# Patient Record
Sex: FEMALE | Race: WHITE | NOT HISPANIC OR LATINO | Employment: OTHER | URBAN - METROPOLITAN AREA
[De-identification: names, ages, dates, MRNs, and addresses within clinical notes are randomized per-mention and may not be internally consistent; named-entity substitution may affect disease eponyms.]

---

## 2017-05-15 ENCOUNTER — HOSPITAL ENCOUNTER (OUTPATIENT)
Dept: BONE DENSITY | Facility: CLINIC | Age: 67
Discharge: HOME | End: 2017-05-15
Attending: OBSTETRICS & GYNECOLOGY | Admitting: OBSTETRICS & GYNECOLOGY
Payer: MEDICARE

## 2017-05-15 DIAGNOSIS — Z78.0 POST-MENOPAUSE: ICD-10-CM

## 2017-05-15 PROCEDURE — 77080 DXA BONE DENSITY AXIAL: CPT

## 2017-12-05 ENCOUNTER — HOSPITAL ENCOUNTER (OUTPATIENT)
Dept: MAMMOGRAPHY | Facility: CLINIC | Age: 67
Discharge: HOME | End: 2017-12-05
Attending: OBSTETRICS & GYNECOLOGY | Admitting: OBSTETRICS & GYNECOLOGY
Payer: MEDICARE

## 2017-12-05 DIAGNOSIS — Z12.31 VISIT FOR SCREENING MAMMOGRAM: ICD-10-CM

## 2017-12-05 PROCEDURE — G0202 SCR MAMMO BI INCL CAD: HCPCS

## 2017-12-05 PROCEDURE — 77063 BREAST TOMOSYNTHESIS BI: CPT

## 2018-01-02 ENCOUNTER — THERAPY VISIT (OUTPATIENT)
Dept: OCCUPATIONAL THERAPY | Facility: CLINIC | Age: 68
End: 2018-01-02
Payer: MEDICARE

## 2018-01-02 DIAGNOSIS — Z47.89 AFTERCARE FOLLOWING SURGERY OF THE MUSCULOSKELETAL SYSTEM: ICD-10-CM

## 2018-01-02 DIAGNOSIS — M25.532 LEFT WRIST PAIN: ICD-10-CM

## 2018-01-02 DIAGNOSIS — M79.645 THUMB PAIN, LEFT: Primary | ICD-10-CM

## 2018-01-02 PROCEDURE — 97110 THERAPEUTIC EXERCISES: CPT | Mod: GO | Performed by: OCCUPATIONAL THERAPIST

## 2018-01-02 PROCEDURE — 97140 MANUAL THERAPY 1/> REGIONS: CPT | Mod: GO | Performed by: OCCUPATIONAL THERAPIST

## 2018-01-02 PROCEDURE — G8984 CARRY CURRENT STATUS: HCPCS | Mod: GO | Performed by: OCCUPATIONAL THERAPIST

## 2018-01-02 PROCEDURE — G8985 CARRY GOAL STATUS: HCPCS | Mod: GO | Performed by: OCCUPATIONAL THERAPIST

## 2018-01-02 PROCEDURE — 97165 OT EVAL LOW COMPLEX 30 MIN: CPT | Mod: GO | Performed by: OCCUPATIONAL THERAPIST

## 2018-01-02 NOTE — PROGRESS NOTES
Hand Therapy Initial Evaluation  Current Date:  1/2/2018    Referring MD: Dr. Montana Summers  Return to MD: Follow up in 5 weeks over phone (Surgeon is in [place])    Procedure:  L Thumb CMC Arthroplasty  Procedure 2: L CTR  DOS: 11/17/2017  Post:  6w 4d    Subjective:  Alem Jack is a 67 year old left hand dominant female.    Patient reports symptoms of pain, stiffness/loss of motion and weakness/loss of strength of the left wrist and thumb which occurred due to above procedures. Since onset symptoms are Gradually getting better.  Special tests:  x-ray.  Previous treatment: Above procedures, OTC wrist cock up splints.    General health as reported by patient is good.  Pertinent medical history includes:cancer, overweight, high blood pressure  Medical allergies:none.  Surgical history: orthopedic: R CMC Arthroplasty, L CMC Arthroplasty, L CTR.  Medication history: sleep, high blood pressure.    Occupational Profile Information:  Current occupation is Retired Teacher, occasionally subbing (music, all ages)  Currently not working due to present treatment problem - hoping to start subbing again in next month or so  Job Tasks: other instrument playing, minimal writing  Prior functional level:  no limitations  Barriers include:none  Mobility: No difficulty  Transportation: drives  Leisure activities/hobbies: Playing piano, french horn, reading  Other: Lives alone, has son and daughter in law close to help as needed    Upper Extremity Functional Index Score:  SCORE:   Column Totals: /80: 73   (A lower score indicates greater disability.)    Objective:    Pain Report:  VAS(0-10) 1/2/18   At Rest: 0/10   With Use: 1-2/10   At Worst 3-4/10   Location:  L CMCJ, incisional pain (CMC and CT)  Description:  Tight/stiff, sensitivity around scars, occasionally aching, throbbing  Frequency:  intermittent   Pain is worse:  At night  Pain is exacerbated by:  Gripping, pinching, opening jars, writing  Pain is relieved by:  Rest,  stopping the aggravating activity, splint  Progression since onset:  improving    ROM: Thumb AROM (PROM)  1/2/18 Date: 1/2/18   Right Side: Left   +/31 MCP  +/40   +/60 IP +/46   30 RABD 35   45 PABD 45     ROM: Wrist AROM (PROM)  1/2/18 Date: 1/2/18   Right Side: Left   66 Extension 52   70 Flexion 47   20 RD 15   35 UD 35   76 Supination 76+   80 Pronation 80      and Pinch Strength (pounds)  1/2 Date: 1/2   Right    Side  Left   52        # 1                # 2                # 3         Average 27 to pain   10  3 Point  # 1               # 2               # 3        Average 5   11  Lateral  # 1                # 2                # 3         Average 8     Edema:  []        None  [x]         Mild   []        Moderate  []         Severe     []         of affected part    Scar/Wound:    All scars feel tight and burning  Carpal Tunnel: Tender to palpation, moderately adhered  CMC: Non tender to palpation, mobile, very mildly adhered  FCR [place]/volar FA: Non tender to palpation, mobile, very mild adhesion    Sensation: [x]         WNL throughout all nerve distributions; per patient report    []         Decreased  Median  Ulnar  Radial  nerve distribution   Comment: Resolved since surgery    Palpation:  Date 1/2/2018   Side Left   CMCJ neg   Adductor neg   Thenars neg   hypothenars neg         Appearance  Date 1/2/18   Hyperextension of MPJ with pinch R: +  L: very mild   Swollen over CMCJ R: -  L: mild                     Assessment:  Patient presents with symptoms consistent with diagnosis as listed above with surgical  intervention.     Patient's limitations or Problem List includes:  Pain, Decreased ROM/motion, Weakness, Adherent scarring, Decreased stability, Decreased , Decreased pinch and Tightness in musculature of the left wrist and thumb which interferes with the patient's ability to perform Self Care Tasks (dressing, eating, bathing, hygiene/toileting), Work Tasks, Recreational Activities and  Household Chores as compared to previous level of function.    Rehab Potential:  Good - Return to full activity, some limitations    Patient will benefit from skilled Occupational Therapy to increase ROM,  strength, pinch strength and stability of thumb and decrease pain and adherence of scarring to return to previous activity level and resume normal daily tasks and to reach their rehab potential.    Barriers to Learning:  No barrier    Communication Issues:  Patient appears to be able to clearly communicate and understand verbal and written communication and follow directions correctly.    Chart Review: Detailed history review with patient    Identified Performance Deficits: dressing, hygiene and grooming, home establishment and management, meal preparation and cleanup, work and leisure activities    Assessment of Occupational Performance:  5 or more Performance Deficits    Clinical Decision Making (Complexity): Low complexity    Treatment Explanation:  The following has been discussed with the patient:  RX ordered/plan of care  Anticipated outcomes  Possible risks and side effects    Plan:  Frequency:  1 X week, once daily  Duration:  for 6 weeks    Treatment Plan:  Modalities:  US and Laser Light  Therapeutic Exercise:  AROM, AAROM, PROM and Stabilization  Manual Techniques:  Scar mobilization and Myofascial release  Self Care:  Self Care Tasks    Home Program:  FABTSS - from post surgical appointment, protection as needed per MD  AROM of thumb: opposition, flexion/extension, circumduction, EPB AROM  AROM of wrist: extension/flexion, circumduction  Scar mobs 2 x daily    Next visit:   US vs Laser to scars  Scar mobs  MFR - thenars, adductor  ROM  Progress to gentle thumb stability/strengthening    Discharge Plan:  Achieve all LTG.  Independent in home treatment program.  Reach maximal therapeutic benefit.    Please see daily flow sheet for treatment and 1:1 time provided today.

## 2018-01-02 NOTE — LETTER
DEPARTMENT OF HEALTH AND HUMAN SERVICES  [facility] [facility]    PLAN/UPDATED PLAN OF PROGRESS FOR OUTPATIENT REHABILITATION  PATIENTS NAME:  Alem Jack   : 1950  PROVIDER NUMBER:  [id number]  [id number]  [facility]  MEDICAL RECORD NUMBER: [id number]   START OF CARE DATE:    SOC Date: 18   TYPE:  OT  PRIMARY/TREATMENT DIAGNOSIS: (Pertinent Medical Diagnosis)   Thumb pain, left  Left wrist pain  Aftercare following surgery of the musculoskeletal system  VISITS FROM START OF CARE:  1 Rxs Used: 1   Hand Therapy Initial Evaluation  Current Date:  2018  Referring MD: Dr. Montana Summers  Return to MD: Follow up in 5 weeks over phone (Surgeon is in [place])  Procedure:  L Thumb CMC Arthroplasty  Procedure 2: L CTR  DOS: 2017  Post:  6w 4d  Subjective:  Alem Jack is a 67 year old left hand dominant female.  Patient reports symptoms of pain, stiffness/loss of motion and weakness/loss of strength of the left wrist and thumb which occurred due to above procedures. Since onset symptoms are Gradually getting better.  Special tests:  x-ray.  Previous treatment: Above procedures, OTC wrist cock up splints.    General health as reported by patient is good.  Pertinent medical history includes:cancer, overweight, high blood pressure  Medical allergies:none.  Surgical history: orthopedic: R CMC Arthroplasty, L CMC Arthroplasty, L CTR.  Medication history: sleep, high blood pressure.  Occupational Profile Information:  Current occupation is Retired Teacher, occasionally subbing (music, all ages)  Currently not working due to present treatment problem - hoping to start subbing again in next month or so  Job Tasks: other instrument playing, minimal writing  Prior functional level:  no limitations  Barriers include:none  Mobility: No difficulty  Transportation: drives  Leisure activities/hobbies: Playing piano, Macanese horn, reading  Other: Lives alone, has  son and daughter in law close to help as needed  Upper Extremity Functional Index Score:  SCORE:   Column Totals: /80: 73   (A lower score indicates greater disability.)  Objective:  Pain Report:  VAS(0-10) 18   At Rest: 0/10   With Use: 1-2/10   At Worst 3-4/10   Location:  L CMCJ, incisional pain (CMC and CT)  Description:  Tight/stiff, sensitivity around scars, occasionally aching, throbbing  Frequency:  intermittent   Pain is worse:  At night  Pain is exacerbated by:  Gripping, pinching, opening jars, writing  Pain is relieved by:  Rest, stopping the aggravating activity, splint  Progression since onset:  improving  ROM: Thumb AROM (PROM)  18 Date: 18   Right Side: Left   +/31 MCP  +/40   +/60 IP +/46   30 RABD 35   45 PABD 45   ROM: Wrist AROM (PROM)  18 Date: 18   Right Side: Left   66 Extension 52   70 Flexion 47   20 RD 15   35 UD 35   76 Supination 76+   80 Pronation 80    and Pinch Strength (pounds)   Date:    Right    Side  Left   52        # 1                # 2                # 3         Average 27 to pain   10  3 Point  # 1               # 2               # 3        Average 5   11  Lateral  # 1                # 2                # 3         Average 8   Edema:  []        None  [x]         Mild   []        Moderate  []         Severe     []         of affected part  Scar/Wound:    All scars feel tight and burning  Carpal Tunnel: Tender to palpation, moderately adhered  CMC: Non tender to palpation, mobile, very mildly adhered  FCR [place]/volar FA: Non tender to palpation, mobile, very mild adhesion  Sensation: [x]         WNL throughout all nerve distributions; per patient report    []         Decreased  Median  Ulnar  Radial  nerve distribution   PATIENTS NAME:  Alem Jack   : 1950    Comment: Resolved since surgery  Palpation:  Date 2018   Side Left   CMCJ neg   Adductor neg   Thenars neg   hypothenars neg       Appearance  Date 18    Hyperextension of MPJ with pinch R: +  L: very mild   Swollen over CMCJ R: -  L: mild                   Assessment:  Patient presents with symptoms consistent with diagnosis as listed above with surgical  intervention.   Patient's limitations or Problem List includes:  Pain, Decreased ROM/motion, Weakness, Adherent scarring, Decreased stability, Decreased , Decreased pinch and Tightness in musculature of the left wrist and thumb which interferes with the patient's ability to perform Self Care Tasks (dressing, eating, bathing, hygiene/toileting), Work Tasks, Recreational Activities and Household Chores as compared to previous level of function.  Rehab Potential:  Good - Return to full activity, some limitations  Patient will benefit from skilled Occupational Therapy to increase ROM,  strength, pinch strength and stability of thumb and decrease pain and adherence of scarring to return to previous activity level and resume normal daily tasks and to reach their rehab potential.  Barriers to Learning:  No barrier  Communication Issues:  Patient appears to be able to clearly communicate and understand verbal and written communication and follow directions correctly.  Chart Review: Detailed history review with patient  Identified Performance Deficits: dressing, hygiene and grooming, home establishment and management, meal preparation and cleanup, work and leisure activities    Assessment of Occupational Performance:  5 or more Performance Deficits  Clinical Decision Making (Complexity): Low complexity  Treatment Explanation:  The following has been discussed with the patient:  RX ordered/plan of care  Anticipated outcomes  Possible risks and side effects  Plan:  Frequency:  1 X week, once daily  Duration:  for 6 weeks  Treatment Plan:  Modalities:  US and Laser Light  Therapeutic Exercise:  AROM, AAROM, PROM and Stabilization  Manual Techniques:  Scar mobilization and Myofascial release  PATIENTS NAME:  Guero  "Alem   : 1950    Self Care:  Self Care Tasks  Home Program:  FABTSS - from post surgical appointment, protection as needed per MD  AROM of thumb: opposition, flexion/extension, circumduction, EPB AROM  AROM of wrist: extension/flexion, circumduction  Scar mobs 2 x daily  Next visit:   US vs Laser to scars  Scar mobs  MFR - thenars, adductor  ROM  Progress to gentle thumb stability/strengthening  Discharge Plan:  Achieve all LTG.  Independent in home treatment program.  Reach maximal therapeutic benefit.    Caregiver Signature/Credentials ______________________________ Date ________       Treating Provider: Ashley Ambriz OTR/L CHT    I have reviewed and certified the need for these services and plan of treatment while under my care.        PHYSICIAN'S SIGNATURE:   _____________________________________  Date___________     Montana Summers MD     Certification period: Beginning of Cert date period: 18 End of Cert period date: 18     Functional Level Progress Report: Please see attached \"Goal Flow sheet for Functional level.\"    ___X_____ Continue Services or       ________ DC Services                Service dates: SOC Date: 18  to present                                                                     "

## 2018-01-08 ENCOUNTER — THERAPY VISIT (OUTPATIENT)
Dept: OCCUPATIONAL THERAPY | Facility: CLINIC | Age: 68
End: 2018-01-08
Payer: MEDICARE

## 2018-01-08 DIAGNOSIS — M79.645 THUMB PAIN, LEFT: ICD-10-CM

## 2018-01-08 DIAGNOSIS — Z47.89 AFTERCARE FOLLOWING SURGERY OF THE MUSCULOSKELETAL SYSTEM: ICD-10-CM

## 2018-01-08 DIAGNOSIS — M25.532 LEFT WRIST PAIN: ICD-10-CM

## 2018-01-08 PROCEDURE — 97140 MANUAL THERAPY 1/> REGIONS: CPT | Mod: GO | Performed by: OCCUPATIONAL THERAPIST

## 2018-01-08 PROCEDURE — 97110 THERAPEUTIC EXERCISES: CPT | Mod: GO | Performed by: OCCUPATIONAL THERAPIST

## 2018-01-08 NOTE — PROGRESS NOTES
SOAP note objective information for 1/8/2018.  ROM: Thumb AROM (PROM)  1/2/18 Date: 1/2/18 1/8/18   Right Side: Left    +/31 MCP  +/40 +/39   +/60 IP +/46 0/28   30 RABD 35 50   45 PABD 45 54     ROM: Wrist AROM (PROM)  1/2/18 Date: 1/2/18 1/8/18   Right Side: Left    66 Extension 52 60   70 Flexion 47 48   20 RD 15 17   35 UD 35 38   76 Supination 76+ 74   80 Pronation 80 81   Please refer to the daily flowsheet for treatment today, total treatment time and time spent performing 1:1 timed codes.       Home Program:  FABTSS - from post surgical appointment, protection as needed per MD  AROM of thumb: opposition, flexion/extension, circumduction, EPB AROM  AROM of wrist: extension/flexion, circumduction  Scar mobs 2 x daily    Next visit:   US vs Laser to scars  Scar mobs  MFR - thenars, adductor  ROM  Progress to gentle thumb stability/strengthening

## 2018-01-15 ENCOUNTER — THERAPY VISIT (OUTPATIENT)
Dept: OCCUPATIONAL THERAPY | Facility: CLINIC | Age: 68
End: 2018-01-15
Payer: MEDICARE

## 2018-01-15 DIAGNOSIS — Z47.89 AFTERCARE FOLLOWING SURGERY OF THE MUSCULOSKELETAL SYSTEM: ICD-10-CM

## 2018-01-15 DIAGNOSIS — M25.532 LEFT WRIST PAIN: ICD-10-CM

## 2018-01-15 DIAGNOSIS — M79.645 THUMB PAIN, LEFT: ICD-10-CM

## 2018-01-15 PROCEDURE — 97140 MANUAL THERAPY 1/> REGIONS: CPT | Mod: GO | Performed by: OCCUPATIONAL THERAPIST

## 2018-01-15 PROCEDURE — 97110 THERAPEUTIC EXERCISES: CPT | Mod: GO | Performed by: OCCUPATIONAL THERAPIST

## 2018-01-15 NOTE — PROGRESS NOTES
SOAP note objective information for 1/15/2018.  ROM: Thumb AROM (PROM)  1/2/18 Date: 1/2/18 1/8/18 1/15/18   Right Side: Left     +/31 MCP  +/40 +/39 +/46   +/60 IP +/46 0/28 0/32   30 RABD 35 50 53   45 PABD 45 54 57     ROM: Wrist AROM (PROM)  1/2/18 Date: 1/2/18 1/8/18 1/15/18   Right Side: Left     66 Extension 52 60 67   70 Flexion 47 48 58   20 RD 15 17 17   35 UD 35 38 45   76 Supination 76+ 74 76   80 Pronation 80 81 81   Please refer to the daily flowsheet for treatment today, total treatment time and time spent performing 1:1 timed codes.       Home Program:  FABTSS - from post surgical appointment, protection as needed per MD  AROM of thumb: opposition, flexion/extension, circumduction, EPB AROM  AROM of wrist: extension/flexion, circumduction  Scar mobs 2 x daily  Strengthening   Wrist isotonics    and pinch strengthening    Next visit:   US vs Laser to scars  Scar mobs  MFR - thenars, adductor  ROM  Progress to gentle thumb stability/strengthening

## 2018-01-24 ENCOUNTER — THERAPY VISIT (OUTPATIENT)
Dept: OCCUPATIONAL THERAPY | Facility: CLINIC | Age: 68
End: 2018-01-24
Payer: MEDICARE

## 2018-01-24 DIAGNOSIS — M79.645 THUMB PAIN, LEFT: ICD-10-CM

## 2018-01-24 DIAGNOSIS — M25.532 LEFT WRIST PAIN: ICD-10-CM

## 2018-01-24 DIAGNOSIS — Z47.89 AFTERCARE FOLLOWING SURGERY OF THE MUSCULOSKELETAL SYSTEM: ICD-10-CM

## 2018-01-24 PROCEDURE — 97110 THERAPEUTIC EXERCISES: CPT | Mod: GO | Performed by: OCCUPATIONAL THERAPIST

## 2018-01-24 PROCEDURE — 97140 MANUAL THERAPY 1/> REGIONS: CPT | Mod: GO | Performed by: OCCUPATIONAL THERAPIST

## 2018-01-24 NOTE — PROGRESS NOTES
SOAP note objective information for 1/24/2018.  ROM: Thumb AROM (PROM)  1/2/18 Date: 1/2/18 1/8/18 1/15/18 1/24/18   Right Side: Left      +/31 MCP  +/40 +/39 +/46 +/47   +/60 IP +/46 0/28 0/32 0/30   30 RABD 35 50 53 55   45 PABD 45 54 57 48     ROM: Wrist AROM (PROM)  1/2/18 Date: 1/2/18 1/8/18 1/15/18 1/24/18   Right Side: Left      66 Extension 52 60 67 62+   70 Flexion 47 48 58 47   20 RD 15 17 17 16   35 UD 35 38 45 37   76 Supination 76+ 74 76 76   80 Pronation 80 81 81 86   Please refer to the daily flowsheet for treatment today, total treatment time and time spent performing 1:1 timed codes.       Home Program:  FABTSS - weaning  AROM of thumb: all planes  AROM of wrist: all planes  Scar mobs 2 x daily  Strengthening   Wrist isotonics    and pinch strengthening-gentle    Next visit:   heat  Scar mobs  MFR - thenars, adductor  ROM  gentle thumb stability/strengthening

## 2018-01-31 ENCOUNTER — THERAPY VISIT (OUTPATIENT)
Dept: OCCUPATIONAL THERAPY | Facility: CLINIC | Age: 68
End: 2018-01-31
Payer: MEDICARE

## 2018-01-31 DIAGNOSIS — M25.532 LEFT WRIST PAIN: ICD-10-CM

## 2018-01-31 DIAGNOSIS — M79.645 THUMB PAIN, LEFT: ICD-10-CM

## 2018-01-31 DIAGNOSIS — Z47.89 AFTERCARE FOLLOWING SURGERY OF THE MUSCULOSKELETAL SYSTEM: ICD-10-CM

## 2018-01-31 PROCEDURE — G8984 CARRY CURRENT STATUS: HCPCS | Mod: GO | Performed by: OCCUPATIONAL THERAPIST

## 2018-01-31 PROCEDURE — 97110 THERAPEUTIC EXERCISES: CPT | Mod: GO | Performed by: OCCUPATIONAL THERAPIST

## 2018-01-31 PROCEDURE — G8985 CARRY GOAL STATUS: HCPCS | Mod: GO | Performed by: OCCUPATIONAL THERAPIST

## 2018-01-31 PROCEDURE — 97140 MANUAL THERAPY 1/> REGIONS: CPT | Mod: GO | Performed by: OCCUPATIONAL THERAPIST

## 2018-01-31 NOTE — PROGRESS NOTES
"Hand Therapy Progress Note    Current Date:  1/31/2018    Referring MD: Dr. Montana Summers  Return to MD: Follow up in 5 weeks over phone (Surgeon is in [place])    Procedure:  L Thumb CMC Arthroplasty  Procedure 2: L CTR  DOS: 11/17/2017  Post:  9w 5d    Reporting period is 1/2/18 to 1/31/2018    Subjective:   Subjective changes noted by patient:  \"It is sore sometimes.  It throbs sometimes.\"  Functional changes noted by patient:  Improvement in Self Care Tasks (dressing, eating, bathing, hygiene/toileting), Work Tasks, Sleep Patterns, Recreational Activities, Household Chores and Driving   Patient has noted adverse reaction to:  None    Functional Outcome Measure:  Upper Extremity Functional Index Score:  SCORE:   Column Totals: /80: 65   (A lower score indicates greater disability.)    Objective:  Pain Report:  VAS(0-10) 1/2/18 1/31/18   At Rest: 0/10 0/10   With Use: 1-2/10    At Worst 3-4/10 5/10   Location:  L CMCJ, index finger MCP joint  Description:  sharp  Frequency:  intermittent   Pain is worse:  At night  Pain is exacerbated by:  Gripping, pinching, opening jars, writing  Pain is relieved by:  Rest, stopping the aggravating activity, splint  Progression since onset:  improving    ROM: Thumb AROM (PROM)  1/2/18 Date: 1/2/18 1/31/18   Right Side: Left    +/31 MCP  +/40 +/41   +/60 IP +/46 +/36   30 RABD 35 60   45 PABD 45 51     ROM: Wrist AROM (PROM)  1/2/18 Date: 1/2/18 1/31/18   Right Side: Left    66 Extension 52 56   70 Flexion 47 44   20 RD 15 16   35 UD 35 45   76 Supination 76+ 77   80 Pronation 80 86      and Pinch Strength (pounds)  1/2 Date: 1/2 1/31/18   Right    Side  Left    52        # 1                # 2                # 3         Average 27 to pain 29  33  44  35   10  3 Point  # 1               # 2               # 3        Average 5 7  8  7  7   11  Lateral  # 1                # 2                # 3         Average 8 8  9  10  9     Edema:  []        None  [x]         Mild   []        " Moderate  []         Severe     []         of affected part    Scar/Wound:    All scars feel tight and burning  Carpal Tunnel: Tender to palpation, moderately adhered  CMC: Non tender to palpation, mobile, very mildly adhered  FCR [place]/volar FA: Non tender to palpation, mobile, very mild adhesion    Sensation: [x]         WNL throughout all nerve distributions; per patient report    []         Decreased  Median  Ulnar  Radial  nerve distribution   Comment: Resolved since surgery    Palpation:  Date 1/2/2018   Side Left   CMCJ neg   Adductor neg   Thenars neg   hypothenars neg         Appearance  Date 1/2/18   Hyperextension of MPJ with pinch R: +  L: very mild   Swollen over CMCJ R: -  L: mild                     Please refer to the daily flowsheet for treatment provided today.     Assessment:  Response to therapy has been improvement to:  ROM of Wrist:  All Planes  Thumb:  All Planes  Strength:   and pinch  Pain:  frequency is less, intensity of pain is decreased, duration of pain is decreased and less tender over affected area    Overall Assessment:  Patient's symptoms are resolving.  Patient is progressing well and is ready to decrease frequency of treatment in the clinic.  STG/LTG:  STGoals have been reviewed and progress or achievement has occurred;  see goal sheet for details and updates.    Plan:  Frequency/Duration:  Recommend continuing to see patient  2 X a month, once daily  for 2 months  Appropriateness of Rx I have re-evaluated this patient and find that the nature, scope, duration and intensity of the therapy is appropriate for the medical condition of the patient.  Recommendations for Continued Therapy  Additions to Treatment Plan -  Therapeutic Exercise:  Hand strengthening    Home Program:  FABTSS - weaning  AROM of thumb: all planes  AROM of wrist: all planes  Scar mobs 2 x daily  Strengthening   Wrist isotonics    and pinch strengthening-gentle    Next visit:   heat  Scar mobs  MFR -  thenars, adductor  ROM  gentle thumb stability/strengthening

## 2018-02-14 ENCOUNTER — THERAPY VISIT (OUTPATIENT)
Dept: OCCUPATIONAL THERAPY | Facility: CLINIC | Age: 68
End: 2018-02-14
Payer: MEDICARE

## 2018-02-14 DIAGNOSIS — Z47.89 AFTERCARE FOLLOWING SURGERY OF THE MUSCULOSKELETAL SYSTEM: ICD-10-CM

## 2018-02-14 DIAGNOSIS — M79.645 THUMB PAIN, LEFT: ICD-10-CM

## 2018-02-14 DIAGNOSIS — M25.532 LEFT WRIST PAIN: ICD-10-CM

## 2018-02-14 PROCEDURE — 97110 THERAPEUTIC EXERCISES: CPT | Mod: GO | Performed by: OCCUPATIONAL THERAPIST

## 2018-02-14 PROCEDURE — 97140 MANUAL THERAPY 1/> REGIONS: CPT | Mod: GO | Performed by: OCCUPATIONAL THERAPIST

## 2018-02-14 NOTE — PROGRESS NOTES
SOAP note objective information for 2/14/2018.  ROM: Thumb AROM (PROM)  1/2/18 Date: 1/2/18 1/31/18 2/14/18   Right Side: Left     +/31 MCP  +/40 +/41 +/43   +/60 IP +/46 +/36 +/44   30 RABD 35 60 60   45 PABD 45 51 54     ROM: Wrist AROM (PROM)  1/2/18 Date: 1/2/18 1/31/18 2/14/18   Right Side: Left     66 Extension 52 56 70   70 Flexion 47 44 50   20 RD 15 16 17   35 UD 35 45 45   76 Supination 76+ 77 77   80 Pronation 80 86      Please refer to the daily flowsheet for treatment today, total treatment time and time spent performing 1:1 timed codes.       Home Program:  FABTSS - weaning  AROM of thumb: all planes  AROM of wrist: all planes  Scar mobs 2 x daily  Strengthening   Wrist isotonics    and pinch strengthening-gentle    Next visit:   heat  Scar mobs  MFR - thenars, adductor  ROM  gentle thumb stability/strengthening

## 2018-03-12 ENCOUNTER — THERAPY VISIT (OUTPATIENT)
Dept: OCCUPATIONAL THERAPY | Facility: CLINIC | Age: 68
End: 2018-03-12
Payer: MEDICARE

## 2018-03-12 DIAGNOSIS — M79.645 THUMB PAIN, LEFT: ICD-10-CM

## 2018-03-12 DIAGNOSIS — Z47.89 AFTERCARE FOLLOWING SURGERY OF THE MUSCULOSKELETAL SYSTEM: ICD-10-CM

## 2018-03-12 DIAGNOSIS — M25.532 LEFT WRIST PAIN: ICD-10-CM

## 2018-03-12 PROCEDURE — 97110 THERAPEUTIC EXERCISES: CPT | Mod: GO | Performed by: OCCUPATIONAL THERAPIST

## 2018-03-12 PROCEDURE — G8986 CARRY D/C STATUS: HCPCS | Mod: GO | Performed by: OCCUPATIONAL THERAPIST

## 2018-03-12 PROCEDURE — 97140 MANUAL THERAPY 1/> REGIONS: CPT | Mod: GO | Performed by: OCCUPATIONAL THERAPIST

## 2018-03-12 PROCEDURE — G8985 CARRY GOAL STATUS: HCPCS | Mod: GO | Performed by: OCCUPATIONAL THERAPIST

## 2018-03-12 NOTE — PROGRESS NOTES
"Hand Therapy Discharge Note    Current Date:  3/12/2018    Referring MD: Dr. Montana Summers  Return to MD: Follow up in 5 weeks over phone (Surgeon is in [place])    Procedure:  L Thumb CMC Arthroplasty  Procedure 2: L CTR  DOS: 11/17/2017  Post:  > 4 months    Reporting period is 1/31/18 to 3/12/2018    Subjective:   Subjective changes noted by patient:  \"There are times when I get these \"phantom pains\" in my thumb, but overall, it is feeling better.\"  Functional changes noted by patient:  Improvement in Self Care Tasks (dressing, eating, bathing, hygiene/toileting), Work Tasks, Sleep Patterns, Recreational Activities, Household Chores and Driving   Patient has noted adverse reaction to:  None    Functional Outcome Measure:  Upper Extremity Functional Index Score:  SCORE:   Column Totals: /80: 73   (A lower score indicates greater disability.)    Objective:  Pain Report:  VAS(0-10) 1/2/18 1/31/18 3/12/18   At Rest: 0/10 0/10 0/10   With Use: 1-2/10     At Worst 3-4/10 5/10 8/10   Location:  L thumb CMCJ  Description:  sharp  Frequency:  intermittent   Pain is worse:  At night  Pain is exacerbated by:  Gripping, pinching, opening jars, writing  Pain is relieved by:  Rest, stopping the aggravating activity, splint  Progression since onset:  improving    ROM: Thumb AROM (PROM)  1/2/18 Date: 1/2/18 1/31/18 3/12/18   Right Side: Left     +/31 MCP  +/40 +/41 +/44   +/60 IP +/46 +/36 +/54   30 RABD 35 60 60   45 PABD 45 51 56     ROM: Wrist AROM (PROM)  1/2/18 Date: 1/2/18 1/31/18 3/12/18   Right Side: Left     66 Extension 52 56 64   70 Flexion 47 44 50   20 RD 15 16 16   35 UD 35 45 45   76 Supination 76+ 77 80   80 Pronation 80 86 87      and Pinch Strength (pounds)  1/2 Date: 1/2 1/31/18 3/12/18   Right    Side  Left     52        # 1                # 2                # 3         Average 27 to pain 29  33  44  35 35  47  47  43   10  3 Point  # 1               # 2               # 3        Average 5 7  8  7  7 " 7  8  8  8   11  Lateral  # 1                # 2                # 3         Average 8 8  9  10  9 11  11  10  11     Edema:  []        None  [x]         Mild   []        Moderate  []         Severe     []         of affected part    Scar/Wound:    All scars feel tight and burning  Carpal Tunnel: Tender to palpation, moderately adhered  CMC: Non tender to palpation, mobile, very mildly adhered  FCR [place]/volar FA: Non tender to palpation, mobile, very mild adhesion    Sensation: [x]         WNL throughout all nerve distributions; per patient report    []         Decreased  Median  Ulnar  Radial  nerve distribution   Comment: Resolved since surgery    Palpation:  Date 1/2/2018   Side Left   CMCJ neg   Adductor neg   Thenars neg   hypothenars neg         Appearance  Date 1/2/18   Hyperextension of MPJ with pinch R: +  L: very mild   Swollen over CMCJ R: -  L: mild     Please refer to the daily flowsheet for treatment provided today.     Assessment:  Response to therapy has been improvement to:  ROM of Wrist:  All Planes  Thumb:  All Planes  Strength:   and pinch  Pain:  frequency is less, intensity of pain is decreased, duration of pain is decreased and less tender over affected area    Overall Assessment:  Patient's symptoms are resolving.  STG/LTG:  STGoals have been reviewed and progress or achievement has occurred;  see goal sheet for details and updates.    Plan:  Frequency/Duration:  Pt. To continue with I HEP.  Appropriateness of Rx I have re-evaluated this patient and find that the nature, scope, duration and intensity of the therapy is appropriate for the medical condition of the patient.  Recommendations for Continued Therapy  DC to I HEP.    Home Program:  FABTSS - weaning  AROM of thumb: all planes  AROM of wrist: all planes  Scar mobs 2 x daily  Strengthening   Wrist isotonics    and pinch strengthening-gentle

## 2018-03-12 NOTE — MR AVS SNAPSHOT
After Visit Summary   3/12/2018    Alem Jack    MRN: [id number]           Patient Information     Date Of Birth          1950        Visit Information        Provider Department      3/12/2018 11:00 AM Alaina Awad OT [facility]        Today's Diagnoses     Thumb pain, left        Left wrist pain        Aftercare following surgery of the musculoskeletal system           Follow-ups after your visit        Who to contact     If you have questions or need follow up information about today's clinic visit or your schedule please contact [facility] directly at [phone].  Normal or non-critical lab and imaging results will be communicated to you by [employer], letter or phone within 4 business days after the clinic has received the results. If you do not hear from us within 7 days, please contact the clinic through [employer] or phone. If you have a critical or abnormal lab result, we will notify you by phone as soon as possible.  Submit refill requests through [employer] or call your pharmacy and they will forward the refill request to us. Please allow 3 business days for your refill to be completed.          Additional Information About Your Visit        MyChart Information     [employer] gives you secure access to your electronic health record. If you see a primary care provider, you can also send messages to your care team and make appointments. If you have questions, please call your primary care clinic.  If you do not have a primary care provider, please call [phone] and they will assist you.        Care EveryWhere ID     This is your Care EveryWhere ID. This could be used by other organizations to access your [facility] medical records  [id number]         Blood Pressure from Last 3 Encounters:   02/03/12 103/57   02/02/12 121/66   04/01/06 120/70    Weight from Last 3 Encounters:   02/03/12 93 kg (205 lb 0.4 oz)   02/02/12 95.3 kg (210 lb)   04/01/06 89.3 kg (196 lb  12.8 oz)              We Performed the Following     MANUAL THER TECH,1+REGIONS,EA 15 MIN     THERAPEUTIC EXERCISES        Primary Care Provider Office Phone # Fax #    Morenita Lam [phone]       [facility] [address]  [facility] [address]        Equal Access to Services     [facility]: Hadii aad ku hadasho Soomaali, waaxda luqadaha, qaybta kaalmada adeegyada, waxay idiin hayaan adeeg khvaleriesh la'aan . So [facility] [phone].    ATENCIÓN: Si habla español, tiene a scruggs disposición servicios gratuitos de asistencia lingüística. [facility] [phone].    We comply with applicable federal civil rights laws and [place] laws. We do not discriminate on the basis of race, color, national origin, age, disability, sex, sexual orientation, or gender identity.            Thank you!     Thank you for choosing [facility]  for your care. Our goal is always to provide you with excellent care. Hearing back from our patients is one way we can continue to improve our services. Please take a few minutes to complete the written survey that you may receive in the mail after your visit with us. Thank you!             Your Updated Medication List - Protect others around you: Learn how to safely use, store and throw away your medicines at www.disposemymeds.org.          This list is accurate as of 3/12/18 11:59 PM.  Always use your most recent med list.                   Brand Name Dispense Instructions for use Diagnosis    CALCIUM + D PO      Take  by mouth.        metoprolol succinate 100 MG 24 hr tablet    TOPROL-XL     Take 100 mg by mouth daily.        PROTONIX 40 MG EC tablet   Generic drug:  pantoprazole     30    1 TABLET DAILY        simvastatin 40 MG tablet    ZOCOR     Take 40 mg by mouth daily.        spironolactone-hydrochlorothiazide 25-25 MG per tablet    ALDACTAZIDE     Take 1 tablet by mouth daily.        traZODone 50 MG tablet    DESYREL     Take 25 mg by mouth  daily.        vitamin D 2000 UNITS Caps      Take  by mouth daily.

## 2018-03-13 PROBLEM — M25.532 LEFT WRIST PAIN: Status: RESOLVED | Noted: 2018-01-02 | Resolved: 2018-03-13

## 2018-03-13 PROBLEM — M79.645 THUMB PAIN, LEFT: Status: RESOLVED | Noted: 2018-01-02 | Resolved: 2018-03-13

## 2018-03-13 PROBLEM — Z47.89 AFTERCARE FOLLOWING SURGERY OF THE MUSCULOSKELETAL SYSTEM: Status: RESOLVED | Noted: 2018-01-02 | Resolved: 2018-03-13

## 2018-06-26 ENCOUNTER — SURGERY (OUTPATIENT)
Age: 68
End: 2018-06-26

## 2018-06-26 ENCOUNTER — HOSPITAL ENCOUNTER (OUTPATIENT)
Facility: CLINIC | Age: 68
Discharge: HOME | End: 2018-06-26
Attending: COLON & RECTAL SURGERY | Admitting: COLON & RECTAL SURGERY
Payer: MEDICARE

## 2018-06-26 VITALS
WEIGHT: 197 LBS | SYSTOLIC BLOOD PRESSURE: 133 MMHG | DIASTOLIC BLOOD PRESSURE: 67 MMHG | OXYGEN SATURATION: 99 % | RESPIRATION RATE: 7 BRPM | HEIGHT: 63 IN | BODY MASS INDEX: 34.91 KG/M2

## 2018-06-26 LAB — COLONOSCOPY: NORMAL

## 2018-06-26 PROCEDURE — 88305 TISSUE EXAM BY PATHOLOGIST: CPT | Performed by: COLON & RECTAL SURGERY

## 2018-06-26 PROCEDURE — 45380 COLONOSCOPY AND BIOPSY: CPT | Performed by: COLON & RECTAL SURGERY

## 2018-06-26 PROCEDURE — 25000128 H RX IP 250 OP 636: Performed by: COLON & RECTAL SURGERY

## 2018-06-26 PROCEDURE — G0500 MOD SEDAT ENDO SERVICE >5YRS: HCPCS | Performed by: COLON & RECTAL SURGERY

## 2018-06-26 PROCEDURE — 88305 TISSUE EXAM BY PATHOLOGIST: CPT | Mod: 26 | Performed by: COLON & RECTAL SURGERY

## 2018-06-26 RX ORDER — NALOXONE HYDROCHLORIDE 0.4 MG/ML
.1-.4 INJECTION, SOLUTION INTRAMUSCULAR; INTRAVENOUS; SUBCUTANEOUS
Status: DISCONTINUED | OUTPATIENT
Start: 2018-06-26 | End: 2018-06-26 | Stop reason: HOSPADM

## 2018-06-26 RX ORDER — FLUMAZENIL 0.1 MG/ML
0.2 INJECTION, SOLUTION INTRAVENOUS
Status: DISCONTINUED | OUTPATIENT
Start: 2018-06-26 | End: 2018-06-26 | Stop reason: HOSPADM

## 2018-06-26 RX ORDER — ONDANSETRON 2 MG/ML
4 INJECTION INTRAMUSCULAR; INTRAVENOUS EVERY 6 HOURS PRN
Status: DISCONTINUED | OUTPATIENT
Start: 2018-06-26 | End: 2018-06-26 | Stop reason: HOSPADM

## 2018-06-26 RX ORDER — PROCHLORPERAZINE MALEATE 5 MG
5 TABLET ORAL EVERY 6 HOURS PRN
Status: DISCONTINUED | OUTPATIENT
Start: 2018-06-26 | End: 2018-06-26 | Stop reason: HOSPADM

## 2018-06-26 RX ORDER — ONDANSETRON 2 MG/ML
4 INJECTION INTRAMUSCULAR; INTRAVENOUS
Status: COMPLETED | OUTPATIENT
Start: 2018-06-26 | End: 2018-06-26

## 2018-06-26 RX ORDER — LIDOCAINE 40 MG/G
CREAM TOPICAL
Status: DISCONTINUED | OUTPATIENT
Start: 2018-06-26 | End: 2018-06-26 | Stop reason: HOSPADM

## 2018-06-26 RX ORDER — DIPHENHYDRAMINE HYDROCHLORIDE 50 MG/ML
INJECTION INTRAMUSCULAR; INTRAVENOUS PRN
Status: DISCONTINUED | OUTPATIENT
Start: 2018-06-26 | End: 2018-06-26 | Stop reason: HOSPADM

## 2018-06-26 RX ORDER — ONDANSETRON 4 MG/1
4 TABLET, ORALLY DISINTEGRATING ORAL EVERY 6 HOURS PRN
Status: DISCONTINUED | OUTPATIENT
Start: 2018-06-26 | End: 2018-06-26 | Stop reason: HOSPADM

## 2018-06-26 RX ORDER — FENTANYL CITRATE 50 UG/ML
INJECTION, SOLUTION INTRAMUSCULAR; INTRAVENOUS PRN
Status: DISCONTINUED | OUTPATIENT
Start: 2018-06-26 | End: 2018-06-26 | Stop reason: HOSPADM

## 2018-06-26 RX ADMIN — FENTANYL CITRATE 100 MCG: 50 INJECTION, SOLUTION INTRAMUSCULAR; INTRAVENOUS at 11:35

## 2018-06-26 RX ADMIN — ONDANSETRON 4 MG: 2 INJECTION INTRAMUSCULAR; INTRAVENOUS at 11:02

## 2018-06-26 RX ADMIN — FENTANYL CITRATE 50 MCG: 50 INJECTION, SOLUTION INTRAMUSCULAR; INTRAVENOUS at 11:42

## 2018-06-26 RX ADMIN — DIPHENHYDRAMINE HYDROCHLORIDE 25 MG: 50 INJECTION, SOLUTION INTRAMUSCULAR; INTRAVENOUS at 11:36

## 2018-06-26 RX ADMIN — MIDAZOLAM 1 MG: 1 INJECTION INTRAMUSCULAR; INTRAVENOUS at 11:42

## 2018-06-26 RX ADMIN — MIDAZOLAM 2 MG: 1 INJECTION INTRAMUSCULAR; INTRAVENOUS at 11:35

## 2018-06-27 LAB — COPATH REPORT: NORMAL

## 2018-12-07 ENCOUNTER — THERAPY VISIT (OUTPATIENT)
Dept: OCCUPATIONAL THERAPY | Facility: CLINIC | Age: 68
End: 2018-12-07
Payer: MEDICARE

## 2018-12-07 DIAGNOSIS — M79.645 THUMB PAIN, LEFT: ICD-10-CM

## 2018-12-07 DIAGNOSIS — M25.649 THUMB JOINT STIFFNESS: Primary | ICD-10-CM

## 2018-12-07 PROCEDURE — 97110 THERAPEUTIC EXERCISES: CPT | Mod: GO | Performed by: OCCUPATIONAL THERAPIST

## 2018-12-07 PROCEDURE — 97760 ORTHOTIC MGMT&TRAING 1ST ENC: CPT | Mod: GO | Performed by: OCCUPATIONAL THERAPIST

## 2018-12-07 PROCEDURE — 97165 OT EVAL LOW COMPLEX 30 MIN: CPT | Mod: GO | Performed by: OCCUPATIONAL THERAPIST

## 2018-12-07 PROCEDURE — G8985 CARRY GOAL STATUS: HCPCS | Mod: GO | Performed by: OCCUPATIONAL THERAPIST

## 2018-12-07 PROCEDURE — G8984 CARRY CURRENT STATUS: HCPCS | Mod: GO | Performed by: OCCUPATIONAL THERAPIST

## 2018-12-07 NOTE — PROGRESS NOTES
Hand Therapy Initial Evaluation    Current Date:  12/7/2018    DOI: 11/21/18 (MD order date)  Diagnosis: Thumb stiffness  Procedure:  CMC arthroplasty and Carpal tunnel release, cortisone injection 11/21/18  DOS: 11/17/17    Subjective:  Alem Jack is a 68 year old L hand dominant female.    Patient reports symptoms of pain, stiffness/loss of motion, weakness/loss of strength and edema of the left thumb which occurred due to repetitive use over time. Since onset symptoms are Unchanged  Special tests:  none.  Previous treatment: Hand therapy, cortisone injection.    General health as reported by patient is good.  Pertinent medical history includes:Cancer, High Blood Pressure, Overweight  Medical allergies:none.  Surgical history: cancer: 2011, orthopedic: Knee, hand in 2012 and 2017.  Medication history: High Blood Pressure, Sleep.    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  Job Tasks: playing instruments, piano, horn  Prior functional level:  no limitations  Barriers include:none  Mobility: No difficulty  Transportation: drives  Leisure activities/hobbies: playing [place] horn      Functional Outcome Measure:   Upper Extremity Functional Index Score:  SCORE:   Column Totals: /80: 74   (A lower score indicates greater disability.)    Objective:  Pain Level Report  VAS(0-10) 12/7/2018   At Rest: 1-2/10   With Use: 8-9/10       Primary Report: location and description  Date 12/7/2018    Side L    Location CMC, MCP joint of thumb, thenars, MCP of IF    Radiation none    Pain Quality Achy    Frequency Constant    Duration Daytime    Exacerbated by  Resting horn on thumb, opening jar, writing    Relieved by rest    Progression since onset Unchanged      Tenderness:  Date 12/7/2018   R CMC of the Thumb    L CMC of the Thumb 3     Appearance:  Date 12/7/2018   Side L   Shoulder deformity is present over the  CMC -   Edema over the CMC joint mild   Noted collapse of MP into hyperextension during pinch +     Thumb Range of Motion:  AROM (PROM)  Date 12/7/2018 12/7/2018   Side: R L   MCP ext 0 0   MCP flex 36 45   IP ext 0 0   IP flex 65 55   RABD 28 30   PABD 25 28     STRENGTH: (Measured in pounds, pain scale 0-10/10)    Date 12/7/2018        Trials Left Right Left Right Left Right Left Right Left Right Left Right   1 43 55             2               3               Avg               Pain 0                3 Point Pinch  Date 12/7/2018        Trials Left Right Left Right Left Right Left Right Left Right Left Right   1 7 8             2               3               Avg               Pain 0                Lateral Pinch  Date 12/7/2018        Trials Left Right Left Right Left Right Left Right Left Right Left Right   1 10 10             2               3               Avg               Pain 0                Assessment:  Patient presents with symptoms consistent with diagnosis of L CMC thumb arthritis, with surgical intervention.    Patient s limitations or Problem List includes: Pain, Decreased ROM/motion, weakness, decreased stability of the CMC joint, decreased  and pinch strength of the thumb which interferes with patients ability to perform  Self Care Tasks (dressing, eating), Recreational Activities and Household Chores  as compared to previous level of function.    Rehab Potential: Good- Return to full activity, some limitations.    Patient will benefit from skilled Occupational Therapy to increase ROM, flexibility, pinch strength, and stability of the thumb and decrease pain to return to previous activity level and resume normal daily tasks and to reach their rehab potential.    Barriers to Learning:  No barrier    Communication Issues: Patient appears to be able to clearly communicate and understand verbal and written communication and follow directions correctly.    Chart Review: Chart Review, Brief history  including review of medical and/or therapy records relating to the presenting problem and Simple history review with patient    Identified Performance Deficits: dressing, health management and maintenance, home establishment and management, meal preparation and cleanup and leisure activities    Assessment of Occupational Performance:  3-5 Performance Deficits    Clinical Decision Making (Complexity): Low complexity    Treatment Explanations:  The following has been discussed with the patient,  Rx ordered/plan of care  Anticipated outcomes  Possible risks and side effects    Treatment Plan:  Frequency:  1 X week, once daily. 1x per week for 4 weeks, then every other week for 2 weeks.  Duration:  for 8 weeks    Treatment Plan:  Therapeutic Exercise: AROM, Isometrics, and Stabilization exercises of the Thumb CMC, including  active and resisted abduction, 1st DI strengthening.  Manual Techniques: Joint Mobilization or reseating of the trapezium  Orthosis:  Hand based Thumb Spica, CMC support (allowing more thumb mobility), thumb support brace to use with french horn  Education: anatomy of CMC, joint protection principles, adaptive equipment as needed.    Home Program:  Exercise: Place and hold of the Stabilization Program 1-2 times a day  Orthosis: Hand based Thumb Spica initially during the day progressing to at night, CMC functional splint during the day  Trial splint with horn playing  Activity: incorporate joint protection into daily functional activities, adaptive equipment as needed.    Discharge Plan:  Achieve all LTG  [place] in home treatment program.  Reach maximal therapeutic benefit.  Please refer to the daily flowsheet for treatment today and total treatment time.      Next Visit:  MFR  Progress CMC stability  Discuss orthosis fit, effectiveness  Future - trial splint options for use with french horn

## 2018-12-07 NOTE — LETTER
DEPARTMENT OF HEALTH AND HUMAN SERVICES  CENTERS FOR MEDICARE & MEDICAID SERVICES    PLAN/UPDATED PLAN OF PROGRESS FOR OUTPATIENT REHABILITATION    PATIENTS NAME:  Alem Jack   : 1950  PROVIDER NUMBER:  [id number]  [id number]   PROVIDER NAME: [facility]  MEDICAL RECORD NUMBER: [id number]   START OF CARE DATE:    SOC Date: 18   TYPE:  OT  PRIMARY/TREATMENT DIAGNOSIS: (Pertinent Medical Diagnosis)     Thumb joint stiffness  Thumb pain, left    VISITS FROM START OF CARE:  Rxs Used: 1     Hand Therapy Initial Evaluation  Current Date:  2018  DOI: 18 (MD order date)  Diagnosis: Thumb stiffness  Procedure:  CMC arthroplasty and Carpal tunnel release, cortisone injection 18  DOS: 17    Subjective:  Alem Jack is a 68 year old L hand dominant female.  Patient reports symptoms of pain, stiffness/loss of motion, weakness/loss of strength and edema of the left thumb which occurred due to repetitive use over time. Since onset symptoms are Unchanged  Special tests:  none.  Previous treatment: Hand therapy, cortisone injection.    General health as reported by patient is good.  Pertinent medical history includes:Cancer, High Blood Pressure, Overweight  Medical allergies:none.  Surgical history: cancer: , orthopedic: Knee, hand in  and .  Medication history: High Blood Pressure, Sleep.    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  Job Tasks: playing instruments, piano, horn  Prior functional level:  no limitations  Barriers include:none  Mobility: No difficulty  Transportation: drives  Leisure activities/hobbies: playing [place] horn  Functional Outcome Measure:   Upper Extremity Functional Index Score:  SCORE:   Column Totals: /80: 74   (A lower score indicates greater disability.)    Objective:  Pain Level Report  PATIENTS NAME:  Guero  Alem   : 1950    VAS(0-10) 2018   At Rest: 1-210   With Use: 8-9/10   Primary Report: location and description  Date 2018    Side L    Location CMC, MCP joint of thumb, thenars, MCP of IF    Radiation none    Pain Quality Achy    Frequency Constant    Duration Daytime    Exacerbated by  Resting horn on thumb, opening jar, writing    Relieved by rest    Progression since onset Unchanged    Tenderness:  Date 2018   R CMC of the Thumb    L CMC of the Thumb 3   Appearance:  Date 2018   Side L   Shoulder deformity is present over the CMC -   Edema over the CMC joint mild   Noted collapse of MP into hyperextension during pinch +   Thumb Range of Motion:  AROM (PROM)  Date 2018   Side: R L   MCP ext 0 0   MCP flex 36 45   IP ext 0 0   IP flex 65 55   RABD 28 30   PABD 25 28   STRENGTH: (Measured in pounds, pain scale 0-10/10)    Date 2018        Trials Left Right Left Right Left Right Left Right Left Right Left Right   1 43 55             2               3               Avg               Pain 0                    PATIENTS NAME:  Alem Jack   : 1950    3 Point Pinch  Date 2018        Trials Left Right Left Right Left Right Left Right Left Right Left Right   1 7 8             2               3               Avg               Pain 0                Lateral Pinch  Date 2018        Trials Left Right Left Right Left Right Left Right Left Right Left Right   1 10 10             2               3               Avg               Pain 0                Assessment:  Patient presents with symptoms consistent with diagnosis of L CMC thumb arthritis, with surgical intervention.    Patient s limitations or Problem List includes: Pain, Decreased ROM/motion, weakness, decreased stability of the CMC joint, decreased  and pinch strength of the thumb which interferes with patients ability to perform  Self Care Tasks (dressing, eating), Recreational  Activities and Household Chores  as compared to previous level of function.    Rehab Potential: Good- Return to full activity, some limitations.    Patient will benefit from skilled Occupational Therapy to increase ROM, flexibility, pinch strength, and stability of the thumb and decrease pain to return to previous activity level and resume normal daily tasks and to reach their rehab potential.    Barriers to Learning:  No barrier    Communication Issues: Patient appears to be able to clearly communicate and understand verbal and written communication and follow directions correctly.    Chart Review: Chart Review, Brief history including review of medical and/or therapy records relating to the presenting problem and Simple history review with patient    Identified Performance Deficits: dressing, health management and maintenance, home establishment and management, meal preparation and cleanup and leisure activities    Assessment of Occupational Performance:  3-5 Performance Deficits    Clinical Decision Making (Complexity): Low complexity    PATIENTS NAME:  Alem Jack   : 1950    Treatment Explanations:  The following has been discussed with the patient,  Rx ordered/plan of care  Anticipated outcomes  Possible risks and side effects    Treatment Plan:  Frequency:  1 X week, once daily. 1x per week for 4 weeks, then every other week for 2 weeks.  Duration:  for 8 weeks    Treatment Plan:  Therapeutic Exercise: AROM, Isometrics, and Stabilization exercises of the Thumb CMC, including  active and resisted abduction, 1st DI strengthening.  Manual Techniques: Joint Mobilization or reseating of the trapezium  Orthosis:  Hand based Thumb Spica, CMC support (allowing more thumb mobility), thumb support brace to use with french horn  Education: anatomy of CMC, joint protection principles, adaptive equipment as needed.    Home Program:  Exercise: Place and hold of the Stabilization Program 1-2 times a  "day  Orthosis: Hand based Thumb Spica initially during the day progressing to at night, CMC functional splint during the day  Trial splint with horn playing  Activity: incorporate joint protection into daily functional activities, adaptive equipment as needed.    Discharge Plan:  Achieve all LTG  [place] in home treatment program.  Reach maximal therapeutic benefit.  Please refer to the daily flowsheet for treatment today and total treatment time.      Next Visit:  MFR  Progress CMC stability  Discuss orthosis fit, effectiveness  Future - trial splint options for use with french horn    Caregiver Signature/Credentials ______________________________ Date ________       Treating Provider: ALIYAH Salgado/SERGIO    I have reviewed and certified the need for these services and plan of treatment while under my care.        PHYSICIAN'S SIGNATURE:   _________________________________________  Date___________    Lopes MD    Certification period: Beginning of Cert date period: 12/07/18 End of Cert period date: 03/06/19     Functional Level Progress Report: Please see attached \"Goal Flow sheet for Functional level.\"    ___X_____ Continue Services or       ________ DC Services                Service dates: SOC Date: 12/07/18  to present                                                                     "

## 2018-12-13 ENCOUNTER — THERAPY VISIT (OUTPATIENT)
Dept: OCCUPATIONAL THERAPY | Facility: CLINIC | Age: 68
End: 2018-12-13
Payer: MEDICARE

## 2018-12-13 DIAGNOSIS — M25.649 THUMB JOINT STIFFNESS: ICD-10-CM

## 2018-12-13 DIAGNOSIS — M79.645 THUMB PAIN, LEFT: ICD-10-CM

## 2018-12-13 PROCEDURE — 97140 MANUAL THERAPY 1/> REGIONS: CPT | Mod: GO | Performed by: OCCUPATIONAL THERAPIST

## 2018-12-13 PROCEDURE — 97110 THERAPEUTIC EXERCISES: CPT | Mod: GO | Performed by: OCCUPATIONAL THERAPIST

## 2018-12-27 ENCOUNTER — THERAPY VISIT (OUTPATIENT)
Dept: OCCUPATIONAL THERAPY | Facility: CLINIC | Age: 68
End: 2018-12-27
Payer: MEDICARE

## 2018-12-27 DIAGNOSIS — M79.645 THUMB PAIN, LEFT: ICD-10-CM

## 2018-12-27 DIAGNOSIS — M25.649 THUMB JOINT STIFFNESS: ICD-10-CM

## 2018-12-27 PROCEDURE — 97110 THERAPEUTIC EXERCISES: CPT | Mod: GO | Performed by: OCCUPATIONAL THERAPIST

## 2018-12-27 PROCEDURE — 97140 MANUAL THERAPY 1/> REGIONS: CPT | Mod: GO | Performed by: OCCUPATIONAL THERAPIST

## 2019-01-03 ENCOUNTER — THERAPY VISIT (OUTPATIENT)
Dept: OCCUPATIONAL THERAPY | Facility: CLINIC | Age: 69
End: 2019-01-03
Payer: MEDICARE

## 2019-01-03 DIAGNOSIS — M25.649 THUMB JOINT STIFFNESS: ICD-10-CM

## 2019-01-03 DIAGNOSIS — M79.645 THUMB PAIN, LEFT: ICD-10-CM

## 2019-01-03 PROCEDURE — 97140 MANUAL THERAPY 1/> REGIONS: CPT | Mod: GO | Performed by: OCCUPATIONAL THERAPIST

## 2019-01-03 PROCEDURE — 97035 APP MDLTY 1+ULTRASOUND EA 15: CPT | Mod: GO | Performed by: OCCUPATIONAL THERAPIST

## 2019-01-10 ENCOUNTER — THERAPY VISIT (OUTPATIENT)
Dept: OCCUPATIONAL THERAPY | Facility: CLINIC | Age: 69
End: 2019-01-10
Payer: MEDICARE

## 2019-01-10 DIAGNOSIS — M25.649 THUMB JOINT STIFFNESS: ICD-10-CM

## 2019-01-10 DIAGNOSIS — M79.645 THUMB PAIN, LEFT: ICD-10-CM

## 2019-01-10 PROCEDURE — 97760 ORTHOTIC MGMT&TRAING 1ST ENC: CPT | Mod: GO | Performed by: OCCUPATIONAL THERAPIST

## 2019-01-10 PROCEDURE — 97035 APP MDLTY 1+ULTRASOUND EA 15: CPT | Mod: GO | Performed by: OCCUPATIONAL THERAPIST

## 2019-01-14 ENCOUNTER — HOSPITAL ENCOUNTER (OUTPATIENT)
Dept: MAMMOGRAPHY | Facility: CLINIC | Age: 69
Discharge: HOME | End: 2019-01-14
Attending: FAMILY MEDICINE | Admitting: FAMILY MEDICINE
Payer: MEDICARE

## 2019-01-14 DIAGNOSIS — Z12.31 VISIT FOR SCREENING MAMMOGRAM: ICD-10-CM

## 2019-01-14 PROCEDURE — 77063 BREAST TOMOSYNTHESIS BI: CPT

## 2019-01-17 ENCOUNTER — THERAPY VISIT (OUTPATIENT)
Dept: OCCUPATIONAL THERAPY | Facility: CLINIC | Age: 69
End: 2019-01-17
Payer: MEDICARE

## 2019-01-17 DIAGNOSIS — M25.649 THUMB JOINT STIFFNESS: ICD-10-CM

## 2019-01-17 DIAGNOSIS — M79.645 THUMB PAIN, LEFT: ICD-10-CM

## 2019-01-17 PROCEDURE — 97110 THERAPEUTIC EXERCISES: CPT | Mod: GO | Performed by: OCCUPATIONAL THERAPIST

## 2019-01-17 PROCEDURE — 97035 APP MDLTY 1+ULTRASOUND EA 15: CPT | Mod: GO | Performed by: OCCUPATIONAL THERAPIST

## 2019-01-17 PROCEDURE — 97140 MANUAL THERAPY 1/> REGIONS: CPT | Mod: GO | Performed by: OCCUPATIONAL THERAPIST

## 2019-01-17 NOTE — PROGRESS NOTES
Progress Note - Hand Therapy  Reporting period is 12/7/18 to 1/17/19.  Current Date:  1/17/2018    DOI: 11/21/18 (MD order date)  Diagnosis: Thumb stiffness  Procedure:  CMC arthroplasty and Carpal tunnel release, cortisone injection 11/21/18  DOS: 11/17/17    Subjectve:   Subjective changes as noted by patient:  The splint did help, and practicing felt ok. Not perfect, but not too bad.   Functional changes noted by patient:  Improvement in Recreational Activities  Patient has noted adverse reaction to:  None    Objective:  Changes noted in objective findings: Yes, see below    Pain Level Report  VAS(0-10) 12/7/2018 1/17/19   At Rest: 1-2/10 1-2/10   With Use: 8-9/10 4-5/10       Primary Report: location and description  Date 12/7/2018 1/17/19   Side L    Location CMC, MCP joint of thumb, thenars, MCP of IF Dorsal metacarpal, CMC, mild pain at IF MCP   Radiation none    Pain Quality Achy Achy, burn   Frequency Constant Intermittant   Duration Daytime Daytime   Exacerbated by  Resting horn on thumb, opening jar, writing Resting horn on thumb, writing   Relieved by rest rest   Progression since onset Unchanged Gradually better     Tenderness:  Date 12/7/2018   R CMC of the Thumb    L CMC of the Thumb 3     Appearance:  Date 12/7/2018   Side L   Shoulder deformity is present over the CMC -   Edema over the CMC joint mild   Noted collapse of MP into hyperextension during pinch +     Thumb Range of Motion:  AROM (PROM)  Date 12/7/2018 12/7/2018   Side: R L   MCP ext 0 0   MCP flex 36 45   IP ext 0 0   IP flex 65 55   RABD 28 30   PABD 25 28     STRENGTH: (Measured in pounds, pain scale 0-10/10)    Date 12/7/2018 1/17/19       Trials Left Right Left Right Left Right Left Right Left Right Left Right   1 43 55 60 65           2               3               Avg               Pain 0  2              3 Point Pinch  Date 12/7/2018 1/17/19       Trials Left Right Left Right Left Right Left Right Left Right Left Right   1 7 8  11 11           2               3               Avg               Pain 0  2              Lateral Pinch  Date 12/7/2018        Trials Left Right Left Right Left Right Left Right Left Right Left Right   1 10 10             2               3               Avg               Pain 0                Assessment:  Response to therapy has been improvement to:  Strength:   and pinch  Pain:  frequency is less, intensity of pain is decreased and duration of pain is decreased    Overall Assessment:  Patient's symptoms are resolving.  Patient would benefit from continued therapy to achieve rehab potential  STG/LTG:  STGoals have been reviewed;  see goal sheet for details and updates.  LTGoals have been reviewed;  see goal sheet for details and updates.    I have re-evaluated this patient and find that the nature, scope, duration and intensity of the therapy is appropriate for the medical condition of the patient.      Treatment Plan:  Frequency:  1 X week, once daily.   Duration:  for 6 weeks    Treatment Plan:  Therapeutic Exercise: AROM, Isometrics, and Stabilization exercises of the Thumb CMC, including  active and resisted abduction, 1st DI strengthening.  Manual Techniques: Joint Mobilization or reseating of the trapezium  Orthosis:  Hand based Thumb Spica, CMC support (allowing more thumb mobility), thumb support brace to use with french horn  Education: anatomy of CMC, joint protection principles, adaptive equipment as needed.    Home Program:  Exercise: Place and hold of the Stabilization Program 1-2 times a day  Orthosis: Hand based Thumb Spica initially during the day progressing to at night, CMC functional splint during the day  Trial splint with horn playing  Activity: incorporate joint protection into daily functional activities, adaptive equipment as needed.    Discharge Plan:  Achieve all LTG  [place] in home treatment program.  Reach maximal therapeutic benefit.  Please refer to the daily flowsheet for  treatment today and total treatment time.      Next Visit:  MFR- over 1st DI  Progress CMC stability  Discuss orthosis fit, effectiveness

## 2019-01-24 ENCOUNTER — THERAPY VISIT (OUTPATIENT)
Dept: OCCUPATIONAL THERAPY | Facility: CLINIC | Age: 69
End: 2019-01-24
Payer: MEDICARE

## 2019-01-24 DIAGNOSIS — M79.645 THUMB PAIN, LEFT: ICD-10-CM

## 2019-01-24 DIAGNOSIS — M25.649 THUMB JOINT STIFFNESS: ICD-10-CM

## 2019-01-24 PROCEDURE — G8985 CARRY GOAL STATUS: HCPCS | Mod: GO | Performed by: OCCUPATIONAL THERAPIST

## 2019-01-24 PROCEDURE — 97140 MANUAL THERAPY 1/> REGIONS: CPT | Mod: GO | Performed by: OCCUPATIONAL THERAPIST

## 2019-01-24 PROCEDURE — 97035 APP MDLTY 1+ULTRASOUND EA 15: CPT | Mod: GO | Performed by: OCCUPATIONAL THERAPIST

## 2019-01-24 PROCEDURE — G8984 CARRY CURRENT STATUS: HCPCS | Mod: GO | Performed by: OCCUPATIONAL THERAPIST

## 2019-01-24 PROCEDURE — 97110 THERAPEUTIC EXERCISES: CPT | Mod: GO | Performed by: OCCUPATIONAL THERAPIST

## 2019-06-19 NOTE — H&P
Pre-Endoscopy History and Physical   Alem Jack MRN# [id number]   YOB: 1950 Age: 67 year old   Date of Procedure: 2018   Primary care provider: Morenita Lam   Type of Endoscopy: colonoscopy   Reason for Procedure: personal history of polyps   Type of Anesthesia Anticipated: Moderate Sedation   HPI:   Alem is a 67 year old female with a personal history of polyps.  She last had a colonoscopy 5 years ago that was normal.  She denies BRBPR, abdominal pain, nausea/vomiting, changes in bowel habits or unintentional weight loss.  Her paternal uncle had colon cancer and her father had colon polyps.    Allergies   Allergen Reactions     Codeine Sulfate GI Disturbance     Erythromycin      Sulfa Drugs       Prior to Admission Medications   Prescriptions Last Dose Informant Patient Reported? Taking?   Calcium Carbonate-Vitamin D (CALCIUM + D PO) 2018  Yes Yes   Sig: Take  by mouth.   Cholecalciferol (VITAMIN D) 2000 UNIT CAPS 2018  Yes Yes   Sig: Take  by mouth daily.   PROTONIX 40 MG OR TBEC 2018  Yes Yes   Si TABLET DAILY   TRAZodone (DESYREL) 50 MG tablet Past Week  Yes Yes   Sig: Take 25 mg by mouth daily.   aspirin 81 MG tablet 2018  Yes Yes   Sig: Take 81 mg by mouth daily   metoprolol (TOPROL-XL) 100 MG 24 hr tablet 2018  Yes Yes   Sig: Take 100 mg by mouth daily.   simvastatin (ZOCOR) 40 MG tablet 2018  Yes Yes   Sig: Take 40 mg by mouth daily.   spironolactone-hydrochlorothiazide (ALDACTAZIDE) 25-25 MG per tablet 2018  Yes Yes   Sig: Take 1 tablet by mouth daily.      Facility-Administered Medications: None      Patient Active Problem List   Diagnosis     Renal cell cancer (H)     HTN (hypertension)     History of colonoscopy     Hyperlipidemia LDL goal <130     Elevated serum creatinine      Past Medical History:   Diagnosis Date     Colon polyps      Elevated serum creatinine      History of colonoscopy     polyps, fu 5 years      "HTN (hypertension) 2000     Hypercholesteremia 2004     Renal cell cancer (H) 2010    robotic surgery [place] Aug 2010      Past Surgical History:   Procedure Laterality Date     ARTHROPLASTY CARPOMETACARPAL (THUMB JOINT)  2/3/2012    Procedure:ARTHROPLASTY CARPOMETACARPAL (THUMB JOINT); Surgeon:ADDIE BLAKE; Location:[facility]     ARTHROPLASTY PATELLO-FEMORAL (KNEE)  2009    right     ARTHROSCOPY KNEE  2011    left     ENDOSCOPIC RELEASE CARPAL TUNNEL  2/3/2012    Procedure:ENDOSCOPIC RELEASE CARPAL TUNNEL; RIGHT ENDOSCOPIC CARPAL TUNNEL RELEASE, RIGHT CARPOMETACARPAL EXCISIONAL ARTHROPLASTY WITH FASCIA IAN ALLOGRAFT ; Surgeon:ADDIE BLAKE; Location:[facility]     LAPAROSCOPIC CHOLECYSTECTOMY  2003     PARATHYROIDECTOMY       robotic partial nephrectomy  Aug 2010    [facility]     TONSILLECTOMY  1      Social History   Substance Use Topics     Smoking status: Never Smoker     Smokeless tobacco: Former User     Alcohol use Yes      Comment: rarely      No family history on file.   PHYSICAL EXAM:   /74  Resp 16  Ht 1.6 m (5' 3\")  Wt 89.4 kg (197 lb)  SpO2 99%  BMI 34.9 kg/m2 Estimated body mass index is 34.9 kg/(m^2) as calculated from the following:    Height as of this encounter: 1.6 m (5' 3\").    Weight as of this encounter: 89.4 kg (197 lb).   RESP: lungs clear to auscultation - no rales, rhonchi or wheezes   CV: regular rates and rhythm   ASA Class 2 - Mild systemic disease    Assessment: 66 y/o woman for screening colonoscopy    Plan: Colonoscopy with moderate sedation.  Risks of the procedure were discussed including, but not limited to, bleeding, perforation and missed lesions.  Patient understands and is willing to proceed.    Montana Reynolds MD ....................  6/26/2018   11:26 AM  Colon and Rectal Surgery Staff  [phone]    " Detail Level: Generalized Products Recommended: Elta MD General Sunscreen Counseling: I recommended a broad spectrum sunscreen with a SPF of 30 or higher.  I explained that SPF 30 sunscreens block approximately 97 percent of the sun's harmful rays.  Sunscreens should be applied at least 15 minutes prior to expected sun exposure and then every 2 hours after that as long as sun exposure continues. If swimming or exercising sunscreen should be reapplied every 45 minutes to an hour after getting wet or sweating.  One ounce, or the equivalent of a shot glass full of sunscreen, is adequate to protect the skin not covered by a bathing suit. I also recommended a lip balm with a sunscreen as well. Sun protective clothing can be used in lieu of sunscreen but must be worn the entire time you are exposed to the sun's rays.

## 2019-07-11 PROBLEM — M79.645 THUMB PAIN, LEFT: Status: RESOLVED | Noted: 2018-12-07 | Resolved: 2019-07-11

## 2019-07-11 PROBLEM — M25.649 THUMB JOINT STIFFNESS: Status: RESOLVED | Noted: 2018-12-07 | Resolved: 2019-07-11

## 2019-11-07 ENCOUNTER — HEALTH MAINTENANCE LETTER (OUTPATIENT)
Age: 69
End: 2019-11-07

## 2020-01-22 ENCOUNTER — HOSPITAL ENCOUNTER (OUTPATIENT)
Dept: MAMMOGRAPHY | Facility: CLINIC | Age: 70
Discharge: HOME | End: 2020-01-22
Attending: OBSTETRICS & GYNECOLOGY | Admitting: OBSTETRICS & GYNECOLOGY
Payer: MEDICARE

## 2020-01-22 DIAGNOSIS — Z12.31 VISIT FOR SCREENING MAMMOGRAM: ICD-10-CM

## 2020-01-22 PROCEDURE — 77063 BREAST TOMOSYNTHESIS BI: CPT

## 2020-02-17 ENCOUNTER — HEALTH MAINTENANCE LETTER (OUTPATIENT)
Age: 70
End: 2020-02-17

## 2020-11-29 ENCOUNTER — HEALTH MAINTENANCE LETTER (OUTPATIENT)
Age: 70
End: 2020-11-29

## 2021-01-26 ENCOUNTER — HOSPITAL ENCOUNTER (OUTPATIENT)
Dept: MAMMOGRAPHY | Facility: CLINIC | Age: 71
Discharge: HOME | End: 2021-01-26
Attending: OBSTETRICS & GYNECOLOGY | Admitting: OBSTETRICS & GYNECOLOGY
Payer: MEDICARE

## 2021-01-26 DIAGNOSIS — Z12.31 VISIT FOR SCREENING MAMMOGRAM: ICD-10-CM

## 2021-01-26 PROCEDURE — 77063 BREAST TOMOSYNTHESIS BI: CPT

## 2022-02-03 ENCOUNTER — HOSPITAL ENCOUNTER (OUTPATIENT)
Dept: MAMMOGRAPHY | Facility: CLINIC | Age: 72
Discharge: HOME | End: 2022-02-03
Attending: OBSTETRICS & GYNECOLOGY | Admitting: OBSTETRICS & GYNECOLOGY
Payer: MEDICARE

## 2022-02-03 DIAGNOSIS — Z12.31 VISIT FOR SCREENING MAMMOGRAM: ICD-10-CM

## 2022-02-03 PROCEDURE — 77067 SCR MAMMO BI INCL CAD: CPT

## 2023-02-13 ENCOUNTER — HOSPITAL ENCOUNTER (OUTPATIENT)
Dept: MAMMOGRAPHY | Facility: CLINIC | Age: 73
Discharge: HOME | End: 2023-02-13
Attending: OBSTETRICS & GYNECOLOGY | Admitting: OBSTETRICS & GYNECOLOGY
Payer: MEDICARE

## 2023-02-13 DIAGNOSIS — Z12.31 VISIT FOR SCREENING MAMMOGRAM: ICD-10-CM

## 2023-02-13 PROCEDURE — 77067 SCR MAMMO BI INCL CAD: CPT

## 2024-02-15 ENCOUNTER — HOSPITAL ENCOUNTER (OUTPATIENT)
Dept: MAMMOGRAPHY | Facility: CLINIC | Age: 74
Discharge: HOME | End: 2024-02-15
Attending: OBSTETRICS & GYNECOLOGY | Admitting: OBSTETRICS & GYNECOLOGY
Payer: MEDICARE

## 2024-02-15 DIAGNOSIS — Z12.31 ENCOUNTER FOR SCREENING MAMMOGRAM FOR BREAST CANCER: ICD-10-CM

## 2024-02-15 PROCEDURE — 77063 BREAST TOMOSYNTHESIS BI: CPT

## 2025-02-18 ENCOUNTER — HOSPITAL ENCOUNTER (OUTPATIENT)
Dept: MAMMOGRAPHY | Facility: CLINIC | Age: 75
Discharge: HOME | End: 2025-02-18
Attending: FAMILY MEDICINE
Payer: MEDICARE

## 2025-02-18 DIAGNOSIS — Z12.31 VISIT FOR SCREENING MAMMOGRAM: ICD-10-CM

## 2025-02-18 PROCEDURE — 77063 BREAST TOMOSYNTHESIS BI: CPT

## 2025-03-02 ENCOUNTER — HEALTH MAINTENANCE LETTER (OUTPATIENT)
Age: 75
End: 2025-03-02

## (undated) DEVICE — SOL WATER IRRIG 1000ML BOTTLE 2F7114

## (undated) RX ORDER — FENTANYL CITRATE 50 UG/ML
INJECTION, SOLUTION INTRAMUSCULAR; INTRAVENOUS
Status: DISPENSED
Start: 2018-06-26

## (undated) RX ORDER — DIPHENHYDRAMINE HYDROCHLORIDE 50 MG/ML
INJECTION INTRAMUSCULAR; INTRAVENOUS
Status: DISPENSED
Start: 2018-06-26

## (undated) RX ORDER — ONDANSETRON 2 MG/ML
INJECTION INTRAMUSCULAR; INTRAVENOUS
Status: DISPENSED
Start: 2018-06-26